# Patient Record
Sex: FEMALE | Race: WHITE | Employment: STUDENT | URBAN - METROPOLITAN AREA
[De-identification: names, ages, dates, MRNs, and addresses within clinical notes are randomized per-mention and may not be internally consistent; named-entity substitution may affect disease eponyms.]

---

## 2020-12-01 PROBLEM — F42.4 SKIN-PICKING DISORDER: Status: ACTIVE | Noted: 2020-12-01

## 2020-12-01 PROBLEM — F33.1 MAJOR DEPRESSIVE DISORDER, RECURRENT, MODERATE (HCC): Status: ACTIVE | Noted: 2020-12-01

## 2020-12-01 PROBLEM — F41.1 GENERALIZED ANXIETY DISORDER: Status: ACTIVE | Noted: 2020-12-01

## 2020-12-30 RX ORDER — ESCITALOPRAM OXALATE 10 MG/1
15 TABLET ORAL DAILY
Qty: 30 TABLET | Refills: 0 | OUTPATIENT
Start: 2020-12-30

## 2020-12-30 NOTE — TELEPHONE ENCOUNTER
From: Syeda Sampson  To:  Office of Fransico Alcocer MD  Sent: 12/28/2020 11:27 AM CST  Subject: Medication Renewal Request    Refills have been requested for the following medications:     escitalopram 10 MG Oral Tab    Preferred pharmacy: Other -

## 2021-08-03 ENCOUNTER — HOSPITAL ENCOUNTER (OUTPATIENT)
Age: 21
Discharge: LEFT BEFORE DISCHARGE | End: 2021-08-03
Payer: COMMERCIAL

## 2021-08-03 VITALS
RESPIRATION RATE: 16 BRPM | WEIGHT: 160 LBS | DIASTOLIC BLOOD PRESSURE: 69 MMHG | HEART RATE: 78 BPM | SYSTOLIC BLOOD PRESSURE: 103 MMHG | BODY MASS INDEX: 28.35 KG/M2 | TEMPERATURE: 97 F | HEIGHT: 63 IN | OXYGEN SATURATION: 99 %

## 2021-08-03 DIAGNOSIS — R11.2 NAUSEA AND VOMITING IN ADULT: Primary | ICD-10-CM

## 2021-08-03 LAB
B-HCG UR QL: NEGATIVE
POCT BILIRUBIN URINE: NEGATIVE
POCT GLUCOSE URINE: NEGATIVE MG/DL
POCT KETONE URINE: NEGATIVE MG/DL
POCT LEUKOCYTE ESTERASE URINE: NEGATIVE
POCT NITRITE URINE: NEGATIVE
POCT PH URINE: 7.5 (ref 5–8)
POCT PROTEIN URINE: NEGATIVE MG/DL
POCT SPECIFIC GRAVITY URINE: 1.02
POCT URINE CLARITY: CLEAR
POCT URINE COLOR: YELLOW
POCT UROBILINOGEN URINE: 0.2 MG/DL
S PYO AG THROAT QL: NEGATIVE

## 2021-08-03 PROCEDURE — 99213 OFFICE O/P EST LOW 20 MIN: CPT

## 2021-08-03 PROCEDURE — 81002 URINALYSIS NONAUTO W/O SCOPE: CPT | Performed by: NURSE PRACTITIONER

## 2021-08-03 PROCEDURE — 81025 URINE PREGNANCY TEST: CPT

## 2021-08-03 PROCEDURE — 99204 OFFICE O/P NEW MOD 45 MIN: CPT

## 2021-08-03 PROCEDURE — 87880 STREP A ASSAY W/OPTIC: CPT

## 2021-08-03 RX ORDER — ONDANSETRON 4 MG/1
4 TABLET, ORALLY DISINTEGRATING ORAL ONCE
Status: COMPLETED | OUTPATIENT
Start: 2021-08-03 | End: 2021-08-03

## 2021-08-03 RX ORDER — ONDANSETRON 4 MG/1
4 TABLET, ORALLY DISINTEGRATING ORAL EVERY 8 HOURS PRN
Qty: 10 TABLET | Refills: 0 | Status: SHIPPED | OUTPATIENT
Start: 2021-08-03 | End: 2021-08-10

## 2021-08-03 NOTE — ED PROVIDER NOTES
Patient Seen in: [facility]      History   Patient presents with:  Nausea/Vomiting/Diarrhea    Stated Complaint: nausea    HPI/Subjective:   HPI    Patient presents to the urgent care with report of having developed nausea and vomiting th weakness  Integumentary: Denies rashes, bruising petechiae                  Positive for stated complaint: nausea  Other systems are as noted in HPI. Constitutional and vital signs reviewed.       All other systems reviewed and negative except as noted abo 1216  Value: POCT Rapid Strep  Comment: (Reviewed)  ------------------------------------------------------------  Time: 08/03 1216  Value: POCT urinalysis dipstick(!)  Comment: (Reviewed)  ------------------------------------------------------------  Time: and prior to discharge, that an emergency medical condition was not or was no longer present. There was no indication for further evaluation, treatment or admission on an emergency basis.   Comprehensive verbal and written discharge and follow-up instructi

## 2021-08-05 LAB — SARS-COV-2 RNA RESP QL NAA+PROBE: NOT DETECTED

## 2023-06-10 ENCOUNTER — HOSPITAL ENCOUNTER (OUTPATIENT)
Age: 23
Discharge: HOME | End: 2023-06-10
Payer: COMMERCIAL

## 2023-06-10 VITALS
BODY MASS INDEX: 30 KG/M2 | TEMPERATURE: 98 F | OXYGEN SATURATION: 97 % | HEART RATE: 79 BPM | RESPIRATION RATE: 18 BRPM | DIASTOLIC BLOOD PRESSURE: 70 MMHG | SYSTOLIC BLOOD PRESSURE: 117 MMHG | WEIGHT: 170 LBS

## 2023-06-10 DIAGNOSIS — N94.89 LABIAL PAIN: ICD-10-CM

## 2023-06-10 DIAGNOSIS — L03.818 CELLULITIS OF OTHER SPECIFIED SITE: Primary | ICD-10-CM

## 2023-06-10 PROCEDURE — 99213 OFFICE O/P EST LOW 20 MIN: CPT

## 2023-06-10 RX ORDER — CEPHALEXIN 500 MG/1
500 CAPSULE ORAL 4 TIMES DAILY
Qty: 40 CAPSULE | Refills: 0 | Status: SHIPPED | OUTPATIENT
Start: 2023-06-10 | End: 2023-06-20

## 2023-06-10 NOTE — DISCHARGE INSTRUCTIONS
Take antibiotics as directed and to completion  Avoid tight fitting clothing to the area  Avoid penetration and/or rubbing the area until symptoms resolve  Establish care with OB/GYNE referral provided

## 2023-06-10 NOTE — ED INITIAL ASSESSMENT (HPI)
C/o abscess for over 1 weeks. Pt reports she went to [facility] and noticed she had an abscess. Pt reports discharge from abscess but then abscess came back. Pt reports abscess popped and came back again. Pt reports pain to abscess.